# Patient Record
Sex: MALE | Race: WHITE | URBAN - METROPOLITAN AREA
[De-identification: names, ages, dates, MRNs, and addresses within clinical notes are randomized per-mention and may not be internally consistent; named-entity substitution may affect disease eponyms.]

---

## 2021-01-20 ENCOUNTER — APPOINTMENT (RX ONLY)
Dept: URBAN - METROPOLITAN AREA CLINIC 131 | Facility: CLINIC | Age: 46
Setting detail: DERMATOLOGY
End: 2021-01-20

## 2021-01-20 VITALS — TEMPERATURE: 99 F

## 2021-01-20 DIAGNOSIS — L40.0 PSORIASIS VULGARIS: ICD-10-CM

## 2021-01-20 PROCEDURE — ? PRESCRIPTION

## 2021-01-20 PROCEDURE — ? COUNSELING

## 2021-01-20 PROCEDURE — 99203 OFFICE O/P NEW LOW 30 MIN: CPT

## 2021-01-20 RX ORDER — TRIAMCINOLONE ACETONIDE 1 MG/G
CREAM TOPICAL QD
Qty: 454 | Refills: 1 | Status: ERX | COMMUNITY
Start: 2021-01-20

## 2021-01-20 RX ADMIN — TRIAMCINOLONE ACETONIDE 1: 1 CREAM TOPICAL at 00:00

## 2021-01-20 ASSESSMENT — LOCATION SIMPLE DESCRIPTION DERM
LOCATION SIMPLE: LOWER BACK
LOCATION SIMPLE: GLUTEAL CLEFT
LOCATION SIMPLE: RIGHT KNEE
LOCATION SIMPLE: LEFT KNEE

## 2021-01-20 ASSESSMENT — LOCATION DETAILED DESCRIPTION DERM
LOCATION DETAILED: RIGHT KNEE
LOCATION DETAILED: INFERIOR LUMBAR SPINE
LOCATION DETAILED: LEFT KNEE
LOCATION DETAILED: GLUTEAL CLEFT

## 2021-01-20 ASSESSMENT — LOCATION ZONE DERM
LOCATION ZONE: LEG
LOCATION ZONE: TRUNK

## 2021-01-20 ASSESSMENT — BSA PSORIASIS: % BODY COVERED IN PSORIASIS: 30

## 2023-10-23 ENCOUNTER — APPOINTMENT (RX ONLY)
Dept: URBAN - METROPOLITAN AREA CLINIC 130 | Facility: CLINIC | Age: 48
Setting detail: DERMATOLOGY
End: 2023-10-23

## 2023-10-23 DIAGNOSIS — L40.0 PSORIASIS VULGARIS: ICD-10-CM

## 2023-10-23 PROCEDURE — ? ORDER TESTS

## 2023-10-23 PROCEDURE — 99214 OFFICE O/P EST MOD 30 MIN: CPT

## 2023-10-23 PROCEDURE — ? PRESCRIPTION

## 2023-10-23 PROCEDURE — ? COUNSELING

## 2023-10-23 RX ORDER — TRIAMCINOLONE ACETONIDE 1 MG/G
1 OINTMENT TOPICAL BID
Qty: 454 | Refills: 1 | Status: ERX

## 2023-10-23 ASSESSMENT — PGA PSORIASIS: PGA PSORIASIS 2020: MODERATE

## 2023-10-23 ASSESSMENT — BSA PSORIASIS: % BODY COVERED IN PSORIASIS: 15

## 2023-10-23 ASSESSMENT — ITCH NUMERIC RATING SCALE: HOW SEVERE IS YOUR ITCHING?: 7

## 2023-10-23 NOTE — PROCEDURE: ORDER TESTS
Bill For Surgical Tray: no
Expected Date Of Service: 10/23/2023
Billing Type: [employer]
Performing Laboratory: [id number]

## 2023-10-31 ENCOUNTER — APPOINTMENT (RX ONLY)
Dept: URBAN - METROPOLITAN AREA CLINIC 106 | Facility: CLINIC | Age: 48
Setting detail: DERMATOLOGY
End: 2023-10-31

## 2023-10-31 DIAGNOSIS — L40.0 PSORIASIS VULGARIS: ICD-10-CM

## 2023-10-31 PROCEDURE — ? ORDER TESTS

## 2023-10-31 NOTE — PROCEDURE: ORDER TESTS
Performing Laboratory: [id number]
Bill For Surgical Tray: no
Expected Date Of Service: 10/31/2023
Billing Type: [employer]

## 2023-11-02 ENCOUNTER — RX ONLY (OUTPATIENT)
Age: 48
Setting detail: RX ONLY
End: 2023-11-02

## 2023-11-02 RX ORDER — GUSELKUMAB 100 MG/ML
1 INJECTION SUBCUTANEOUS AS DIRECTED
Qty: 3 | Refills: 5 | Status: ERX | COMMUNITY
Start: 2023-11-02

## 2023-12-04 ENCOUNTER — APPOINTMENT (RX ONLY)
Dept: URBAN - METROPOLITAN AREA CLINIC 130 | Facility: CLINIC | Age: 48
Setting detail: DERMATOLOGY
End: 2023-12-04

## 2023-12-04 DIAGNOSIS — L40.0 PSORIASIS VULGARIS: ICD-10-CM

## 2023-12-04 PROCEDURE — 99214 OFFICE O/P EST MOD 30 MIN: CPT

## 2023-12-04 PROCEDURE — ? ORDER TESTS

## 2023-12-04 PROCEDURE — ? COUNSELING

## 2023-12-04 ASSESSMENT — PGA PSORIASIS: PGA PSORIASIS 2020: ALMOST CLEAR

## 2023-12-04 ASSESSMENT — ITCH NUMERIC RATING SCALE: HOW SEVERE IS YOUR ITCHING?: 3

## 2023-12-04 ASSESSMENT — BSA PSORIASIS: % BODY COVERED IN PSORIASIS: 5

## 2023-12-04 NOTE — PROCEDURE: COUNSELING
Patient Specific Counseling (Will Not Stick From Patient To Patient): PLT were slightly lower in Oct, pt did not get labs redone- reordered today expressed that it was important to get done ASAP
Detail Level: Zone

## 2023-12-04 NOTE — PROCEDURE: ORDER TESTS
Bill For Surgical Tray: no
Performing Laboratory: [id number]
Expected Date Of Service: 12/04/2023
Billing Type: Third-Party Bill

## 2025-01-14 ENCOUNTER — APPOINTMENT (OUTPATIENT)
Dept: URBAN - METROPOLITAN AREA CLINIC 130 | Facility: CLINIC | Age: 50
Setting detail: DERMATOLOGY
End: 2025-01-14

## 2025-01-14 DIAGNOSIS — L40.0 PSORIASIS VULGARIS: ICD-10-CM

## 2025-01-14 PROCEDURE — ? ORDER TESTS

## 2025-01-14 PROCEDURE — ? COUNSELING

## 2025-01-14 PROCEDURE — 99214 OFFICE O/P EST MOD 30 MIN: CPT

## 2025-01-14 ASSESSMENT — BSA PSORIASIS: % BODY COVERED IN PSORIASIS: 10

## 2025-01-14 ASSESSMENT — ITCH NUMERIC RATING SCALE: HOW SEVERE IS YOUR ITCHING?: 8

## 2025-01-14 ASSESSMENT — PGA PSORIASIS: PGA PSORIASIS 2020: MODERATE

## 2025-01-14 NOTE — PROCEDURE: COUNSELING
Detail Level: Simple
Patient Specific Counseling (Will Not Stick From Patient To Patient): Flaring, he has been off meds for several months and is starting to flare now.

## 2025-01-14 NOTE — PROCEDURE: ORDER TESTS
Performing Laboratory: [id number]
Bill For Surgical Tray: no
Billing Type: Third-Party Bill
Expected Date Of Service: 01/14/2025

## 2025-02-05 ENCOUNTER — RX ONLY (RX ONLY)
Age: 50
End: 2025-02-05

## 2025-02-05 RX ORDER — GUSELKUMAB 100 MG/ML
1 INJECTION SUBCUTANEOUS AS DIRECTED
Qty: 3 | Refills: 5 | Status: ERX

## 2025-08-04 ENCOUNTER — APPOINTMENT (OUTPATIENT)
Dept: URBAN - METROPOLITAN AREA CLINIC 130 | Facility: CLINIC | Age: 50
Setting detail: DERMATOLOGY
End: 2025-08-04

## 2025-08-04 DIAGNOSIS — L57.8 OTHER SKIN CHANGES DUE TO CHRONIC EXPOSURE TO NONIONIZING RADIATION: ICD-10-CM

## 2025-08-04 DIAGNOSIS — D22 MELANOCYTIC NEVI: ICD-10-CM

## 2025-08-04 DIAGNOSIS — L40.0 PSORIASIS VULGARIS: ICD-10-CM

## 2025-08-04 DIAGNOSIS — L82.1 OTHER SEBORRHEIC KERATOSIS: ICD-10-CM

## 2025-08-04 PROBLEM — D22.5 MELANOCYTIC NEVI OF TRUNK: Status: ACTIVE | Noted: 2025-08-04

## 2025-08-04 PROCEDURE — ? ADDITIONAL NOTES

## 2025-08-04 PROCEDURE — ?

## 2025-08-04 PROCEDURE — ? COUNSELING

## 2025-08-04 PROCEDURE — ? SUNSCREEN RECOMMENDATIONS

## 2025-08-04 PROCEDURE — ? PRESCRIPTION

## 2025-08-04 RX ORDER — TRIAMCINOLONE ACETONIDE 1 MG/G
1 CREAM TOPICAL BID
Qty: 454 | Refills: 1 | Status: ERX

## 2025-08-04 ASSESSMENT — LOCATION DETAILED DESCRIPTION DERM
LOCATION DETAILED: EPIGASTRIC SKIN
LOCATION DETAILED: PERIUMBILICAL SKIN
LOCATION DETAILED: LEFT KNEE
LOCATION DETAILED: RIGHT INFERIOR MEDIAL FOREHEAD

## 2025-08-04 ASSESSMENT — LOCATION SIMPLE DESCRIPTION DERM
LOCATION SIMPLE: LEFT KNEE
LOCATION SIMPLE: RIGHT FOREHEAD
LOCATION SIMPLE: ABDOMEN

## 2025-08-04 ASSESSMENT — ITCH NUMERIC RATING SCALE: HOW SEVERE IS YOUR ITCHING?: 5

## 2025-08-04 ASSESSMENT — PGA PSORIASIS: PGA PSORIASIS 2020: MILD

## 2025-08-04 ASSESSMENT — LOCATION ZONE DERM
LOCATION ZONE: FACE
LOCATION ZONE: TRUNK
LOCATION ZONE: LEG

## 2025-08-04 ASSESSMENT — BSA PSORIASIS: % BODY COVERED IN PSORIASIS: 2
